# Patient Record
Sex: FEMALE | URBAN - METROPOLITAN AREA
[De-identification: names, ages, dates, MRNs, and addresses within clinical notes are randomized per-mention and may not be internally consistent; named-entity substitution may affect disease eponyms.]

---

## 2018-02-05 ENCOUNTER — APPOINTMENT (OUTPATIENT)
Dept: URBAN - METROPOLITAN AREA CLINIC 319 | Age: 73
Setting detail: DERMATOLOGY
End: 2018-02-05

## 2018-02-05 DIAGNOSIS — L57.0 ACTINIC KERATOSIS: ICD-10-CM

## 2018-02-05 PROCEDURE — 17000 DESTRUCT PREMALG LESION: CPT

## 2018-02-05 PROCEDURE — OTHER LIQUID NITROGEN: OTHER

## 2018-02-05 PROCEDURE — OTHER MIPS QUALITY: OTHER

## 2018-02-05 PROCEDURE — OTHER COUNSELING: OTHER

## 2018-02-05 PROCEDURE — OTHER TREATMENT REGIMEN: OTHER

## 2018-02-05 ASSESSMENT — LOCATION DETAILED DESCRIPTION DERM: LOCATION DETAILED: NASAL SUPRATIP

## 2018-02-05 ASSESSMENT — LOCATION SIMPLE DESCRIPTION DERM: LOCATION SIMPLE: NOSE

## 2018-02-05 ASSESSMENT — LOCATION ZONE DERM: LOCATION ZONE: NOSE

## 2018-02-05 NOTE — PROCEDURE: LIQUID NITROGEN
Duration Of Freeze Thaw-Cycle (Seconds): 4
Render Post-Care Instructions In Note?: yes
Detail Level: Detailed
Post-Care Instructions: Apply Vaseline to soothe as needed for treated area(s)
Number Of Freeze-Thaw Cycles: 1 freeze-thaw cycle